# Patient Record
Sex: MALE | Race: WHITE | Employment: OTHER | URBAN - METROPOLITAN AREA
[De-identification: names, ages, dates, MRNs, and addresses within clinical notes are randomized per-mention and may not be internally consistent; named-entity substitution may affect disease eponyms.]

---

## 2019-07-19 ENCOUNTER — APPOINTMENT (OUTPATIENT)
Dept: GENERAL RADIOLOGY | Age: 34
End: 2019-07-19

## 2019-07-19 ENCOUNTER — HOSPITAL ENCOUNTER (EMERGENCY)
Age: 34
Discharge: HOME | End: 2019-07-19

## 2019-07-19 VITALS
HEIGHT: 70 IN | TEMPERATURE: 98.2 F | HEART RATE: 77 BPM | RESPIRATION RATE: 16 BRPM | BODY MASS INDEX: 22.9 KG/M2 | WEIGHT: 160 LBS | DIASTOLIC BLOOD PRESSURE: 57 MMHG | OXYGEN SATURATION: 98 % | SYSTOLIC BLOOD PRESSURE: 112 MMHG

## 2019-07-19 DIAGNOSIS — S61.431A PUNCTURE WOUND OF RIGHT HAND, FOREIGN BODY PRESENCE UNSPECIFIED, INITIAL ENCOUNTER: Primary | ICD-10-CM

## 2019-07-19 PROCEDURE — 90471 IMMUNIZATION ADMIN: CPT | Performed by: PHYSICIAN ASSISTANT

## 2019-07-19 PROCEDURE — 90715 TDAP VACCINE 7 YRS/> IM: CPT | Performed by: PHYSICIAN ASSISTANT

## 2019-07-19 PROCEDURE — 99283 EMERGENCY DEPT VISIT LOW MDM: CPT

## 2019-07-19 PROCEDURE — 73130 X-RAY EXAM OF HAND: CPT

## 2019-07-19 PROCEDURE — 6360000002 HC RX W HCPCS: Performed by: PHYSICIAN ASSISTANT

## 2019-07-19 PROCEDURE — 6370000000 HC RX 637 (ALT 250 FOR IP): Performed by: PHYSICIAN ASSISTANT

## 2019-07-19 RX ORDER — NAPROXEN 500 MG/1
500 TABLET ORAL 2 TIMES DAILY
Qty: 14 TABLET | Refills: 0 | Status: SHIPPED | OUTPATIENT
Start: 2019-07-19 | End: 2022-10-21

## 2019-07-19 RX ORDER — AMOXICILLIN AND CLAVULANATE POTASSIUM 875; 125 MG/1; MG/1
1 TABLET, FILM COATED ORAL 2 TIMES DAILY
Qty: 14 TABLET | Refills: 0 | Status: SHIPPED | OUTPATIENT
Start: 2019-07-19 | End: 2019-07-26

## 2019-07-19 RX ORDER — HYDROCODONE BITARTRATE AND ACETAMINOPHEN 5; 325 MG/1; MG/1
1 TABLET ORAL ONCE
Status: COMPLETED | OUTPATIENT
Start: 2019-07-19 | End: 2019-07-19

## 2019-07-19 RX ORDER — AMOXICILLIN AND CLAVULANATE POTASSIUM 875; 125 MG/1; MG/1
1 TABLET, FILM COATED ORAL ONCE
Status: COMPLETED | OUTPATIENT
Start: 2019-07-19 | End: 2019-07-19

## 2019-07-19 RX ADMIN — TETANUS TOXOID, REDUCED DIPHTHERIA TOXOID AND ACELLULAR PERTUSSIS VACCINE, ADSORBED 0.5 ML: 5; 2.5; 8; 8; 2.5 SUSPENSION INTRAMUSCULAR at 18:43

## 2019-07-19 RX ADMIN — HYDROCODONE BITARTRATE AND ACETAMINOPHEN 1 TABLET: 5; 325 TABLET ORAL at 18:42

## 2019-07-19 RX ADMIN — AMOXICILLIN AND CLAVULANATE POTASSIUM 1 TABLET: 875; 125 TABLET, FILM COATED ORAL at 18:42

## 2019-07-19 ASSESSMENT — PAIN DESCRIPTION - LOCATION: LOCATION: HAND

## 2019-07-19 ASSESSMENT — PAIN SCALES - GENERAL: PAINLEVEL_OUTOF10: 10

## 2020-10-23 ENCOUNTER — HOSPITAL ENCOUNTER (EMERGENCY)
Age: 35
Discharge: HOME | End: 2020-10-23

## 2020-10-23 VITALS
HEART RATE: 66 BPM | HEIGHT: 70 IN | DIASTOLIC BLOOD PRESSURE: 60 MMHG | RESPIRATION RATE: 14 BRPM | BODY MASS INDEX: 22.9 KG/M2 | WEIGHT: 160 LBS | TEMPERATURE: 98.2 F | OXYGEN SATURATION: 98 % | SYSTOLIC BLOOD PRESSURE: 132 MMHG

## 2020-10-23 PROCEDURE — 99284 EMERGENCY DEPT VISIT MOD MDM: CPT

## 2020-10-23 PROCEDURE — 6370000000 HC RX 637 (ALT 250 FOR IP): Performed by: PHYSICIAN ASSISTANT

## 2020-10-23 RX ORDER — IBUPROFEN 600 MG/1
600 TABLET ORAL ONCE
Status: COMPLETED | OUTPATIENT
Start: 2020-10-23 | End: 2020-10-23

## 2020-10-23 RX ORDER — TOBRAMYCIN 3 MG/ML
1 SOLUTION/ DROPS OPHTHALMIC EVERY 4 HOURS
Qty: 1 BOTTLE | Refills: 0 | Status: SHIPPED | OUTPATIENT
Start: 2020-10-23 | End: 2020-11-02

## 2020-10-23 RX ORDER — TETRACAINE HYDROCHLORIDE 5 MG/ML
2 SOLUTION OPHTHALMIC ONCE
Status: COMPLETED | OUTPATIENT
Start: 2020-10-23 | End: 2020-10-23

## 2020-10-23 RX ADMIN — TETRACAINE HYDROCHLORIDE 2 DROP: 5 SOLUTION OPHTHALMIC at 12:03

## 2020-10-23 RX ADMIN — FLUORESCEIN SODIUM 1 EACH: 0.6 STRIP OPHTHALMIC at 12:03

## 2020-10-23 RX ADMIN — IBUPROFEN 600 MG: 600 TABLET ORAL at 12:02

## 2020-10-23 ASSESSMENT — PAIN DESCRIPTION - LOCATION: LOCATION: EYE

## 2020-10-23 ASSESSMENT — VISUAL ACUITY
OS: 20/20
OD: 20/20
OU: 20/20

## 2020-10-23 ASSESSMENT — PAIN SCALES - GENERAL
PAINLEVEL_OUTOF10: 10
PAINLEVEL_OUTOF10: 10

## 2020-10-23 ASSESSMENT — PAIN DESCRIPTION - ORIENTATION: ORIENTATION: LEFT

## 2020-10-23 NOTE — ED PROVIDER NOTES
[facility]                                                                                                                                    Department of Emergency Medicine   ED  Provider Note  Admit Date/RoomTime: 10/23/2020 11:28 AM  ED Room: [place] [address]/[facility]-03        HPI:  10/23/20,   Time: 11:49 AM EDT         Heather Milan is a 38 y.o. male presenting to the ED for left eye injury/pain, beginning 1 day ago. The complaint has been persistent, moderate in severity, and worsened by opening eye/lights. The patient states he is a  and a piece of shingle flew up and hit him in left eye yesterday. Pt does not wear contacts. States eye is painful and tearing frequently. States it doesn't feel like anything is stuck in the eye. No purulent drainage. Pt does not wear contacts. States Td is UTD. No other injuries or complaints. ROS:     Constitutional: Negative for fever and chills  HENT: Negative for ear pain, sore throat and sinus pressure  Eyes: See HPi  Respiratory:  Negative for shortness of breath, cough and wheezing  Cardiovascular: Negative for CP, edema or palpitations  Gastrointestinal: Negative for nausea, vomiting, diarrhea and abdominal distention  Genitourinary: Negative for dysuria and frequency  Musculoskeletal: Negative for back pain and arthralgia  Skin: Negative for rash and wound  Neurological: Negative for weakness and headaches  Hematological: Negative for adenopathy    All other systems reviewed and are negative      -------------------------------- PAST HISTORY ----------------------------------  Past Medical History:  has a past medical history of GERD (gastroesophageal reflux disease) and Ruptured spleen. Past Surgical History:  has a past surgical history that includes Abdomen surgery. Social History:  reports that he has been smoking cigarettes. He has a 5.00 pack-year smoking history. He has never used smokeless tobacco. He reports current alcohol use.  He reports current drug use. Drug: Marijuana. Family History: family history is not on file. The patients home medications have been reviewed. Allergies: Patient has no known allergies. --------------------------------- RESULTS ------------------------------------------  All laboratory and radiology results have been personally reviewed by myself   LABS:  No results found for this visit on 10/23/20. RADIOLOGY:  Interpreted by Radiologist.  No orders to display       ----------------- NURSING NOTES AND VITALS REVIEWED ---------------   The nursing notes within the ED encounter and vital signs as below have been reviewed. /60   Pulse 66   Temp 98.2 °F (36.8 °C) (Oral)   Resp 14   Ht 5' 10\" (1.778 m)   Wt 160 lb (72.6 kg)   SpO2 98%   BMI 22.96 kg/m²   Oxygen Saturation Interpretation: Normal      --------------------------------PHYSICAL EXAM------------------------------------      Constitutional/General: Alert and oriented x3, well appearing, non toxic in NAD  Head: NC/AT  Eyes: PERRL, EOMI. Left eye is injected/red. No visible drainage. No penetrating injury/trauma. Eye examined using tetracaine drops and fluorescein. Questionable small abrasion seen at 7 oclock. No visible FB. Upper and lower eyelids everted. No visible FB noted  Mouth: Oropharynx clear, handling secretions, no trismus  Neck: Supple, full ROM, no meningeal signs  Pulmonary: Lungs clear to auscultation bilaterally, no wheezes, rales, or rhonchi. Not in respiratory distress  Cardiovascular:  Regular rate and rhythm, no murmurs, gallops, or rubs. 2+ distal pulses  Extremities: Moves all extremities x 4. Warm and well perfused  Skin: warm and dry without rash  Neurologic: GCS 15,  Intact.   No focal deficits  Psych: Normal Affect      ------------------------ ED COURSE/MEDICAL DECISION MAKING----------------------  Medications   tetracaine (TETRAVISC) 0.5 % ophthalmic solution 2 drop (2 drops Left Eye Given 10/23/20 1203)   fluorescein ophthalmic strip 1 each (1 each Left Eye Given 10/23/20 1203)   ibuprofen (ADVIL;MOTRIN) tablet 600 mg (600 mg Oral Given 10/23/20 1202)         Medical Decision Making:    Pt has small corneal abrasion. ? Early conjunctivitis. No visible FB seen. Pt reassured. F/U Opthamology as needed if persistent symptoms. .         Counseling: The emergency provider has spoken with the patient and discussed todays results, in addition to providing specific details for the plan of care and counseling regarding the diagnosis and prognosis. Questions are answered at this time and they are agreeable with the plan.      ------------------------ IMPRESSION AND DISPOSITION -------------------------------    IMPRESSION  1.  Abrasion of right cornea, initial encounter        DISPOSITION  Disposition: Discharge to home  Patient condition is stable                   Shanae Sommer PA-C  10/23/20 1222

## 2020-10-23 NOTE — ED NOTES
INSTRUCTION TO PT PER ESTEBAN WALTON PAC. LT EYE REDDENED BUT STATES PAIN IS LESS.       Jennifer Harper LPN  78/11/67 1028

## 2022-08-23 ENCOUNTER — APPOINTMENT (OUTPATIENT)
Dept: GENERAL RADIOLOGY | Age: 37
End: 2022-08-23

## 2022-08-23 ENCOUNTER — HOSPITAL ENCOUNTER (EMERGENCY)
Age: 37
Discharge: HOME | End: 2022-08-23
Attending: EMERGENCY MEDICINE

## 2022-08-23 ENCOUNTER — APPOINTMENT (OUTPATIENT)
Dept: CT IMAGING | Age: 37
End: 2022-08-23

## 2022-08-23 VITALS
WEIGHT: 160 LBS | BODY MASS INDEX: 22.9 KG/M2 | HEIGHT: 70 IN | DIASTOLIC BLOOD PRESSURE: 87 MMHG | HEART RATE: 62 BPM | TEMPERATURE: 98.1 F | SYSTOLIC BLOOD PRESSURE: 129 MMHG | RESPIRATION RATE: 17 BRPM | OXYGEN SATURATION: 97 %

## 2022-08-23 DIAGNOSIS — R10.13 ABDOMINAL PAIN, EPIGASTRIC: Primary | ICD-10-CM

## 2022-08-23 DIAGNOSIS — K29.00 ACUTE SUPERFICIAL GASTRITIS WITHOUT HEMORRHAGE: ICD-10-CM

## 2022-08-23 LAB
ALBUMIN SERPL-MCNC: 4.5 G/DL (ref 3.5–5.2)
ALP BLD-CCNC: 86 U/L (ref 40–129)
ALT SERPL-CCNC: 23 U/L (ref 0–40)
ANION GAP SERPL CALCULATED.3IONS-SCNC: 11 MMOL/L (ref 7–16)
AST SERPL-CCNC: 21 U/L (ref 0–39)
BASOPHILS ABSOLUTE: 0.05 E9/L (ref 0–0.2)
BASOPHILS RELATIVE PERCENT: 0.5 % (ref 0–2)
BILIRUB SERPL-MCNC: 0.4 MG/DL (ref 0–1.2)
BILIRUBIN DIRECT: <0.2 MG/DL (ref 0–0.3)
BILIRUBIN, INDIRECT: NORMAL MG/DL (ref 0–1)
BUN BLDV-MCNC: 15 MG/DL (ref 6–20)
CALCIUM SERPL-MCNC: 9.3 MG/DL (ref 8.6–10.2)
CHLORIDE BLD-SCNC: 104 MMOL/L (ref 98–107)
CO2: 24 MMOL/L (ref 22–29)
CREAT SERPL-MCNC: 0.8 MG/DL (ref 0.7–1.2)
EOSINOPHILS ABSOLUTE: 0.2 E9/L (ref 0.05–0.5)
EOSINOPHILS RELATIVE PERCENT: 1.9 % (ref 0–6)
GFR AFRICAN AMERICAN: >60
GFR NON-AFRICAN AMERICAN: >60 ML/MIN/1.73
GLUCOSE BLD-MCNC: 123 MG/DL (ref 74–99)
HCT VFR BLD CALC: 40.9 % (ref 37–54)
HEMOGLOBIN: 14.3 G/DL (ref 12.5–16.5)
IMMATURE GRANULOCYTES #: 0.05 E9/L
IMMATURE GRANULOCYTES %: 0.5 % (ref 0–5)
LIPASE: 44 U/L (ref 13–60)
LYMPHOCYTES ABSOLUTE: 1.43 E9/L (ref 1.5–4)
LYMPHOCYTES RELATIVE PERCENT: 13.5 % (ref 20–42)
MCH RBC QN AUTO: 33.2 PG (ref 26–35)
MCHC RBC AUTO-ENTMCNC: 35 % (ref 32–34.5)
MCV RBC AUTO: 94.9 FL (ref 80–99.9)
MONOCYTES ABSOLUTE: 0.59 E9/L (ref 0.1–0.95)
MONOCYTES RELATIVE PERCENT: 5.6 % (ref 2–12)
NEUTROPHILS ABSOLUTE: 8.3 E9/L (ref 1.8–7.3)
NEUTROPHILS RELATIVE PERCENT: 78 % (ref 43–80)
PDW BLD-RTO: 11.2 FL (ref 11.5–15)
PLATELET # BLD: 255 E9/L (ref 130–450)
PMV BLD AUTO: 9.4 FL (ref 7–12)
POTASSIUM REFLEX MAGNESIUM: 4.1 MMOL/L (ref 3.5–5)
RBC # BLD: 4.31 E12/L (ref 3.8–5.8)
SODIUM BLD-SCNC: 139 MMOL/L (ref 132–146)
TOTAL PROTEIN: 6.7 G/DL (ref 6.4–8.3)
TROPONIN, HIGH SENSITIVITY: 7 NG/L (ref 0–11)
WBC # BLD: 10.6 E9/L (ref 4.5–11.5)

## 2022-08-23 PROCEDURE — 93005 ELECTROCARDIOGRAM TRACING: CPT | Performed by: EMERGENCY MEDICINE

## 2022-08-23 PROCEDURE — 6360000002 HC RX W HCPCS: Performed by: EMERGENCY MEDICINE

## 2022-08-23 PROCEDURE — 2500000003 HC RX 250 WO HCPCS: Performed by: EMERGENCY MEDICINE

## 2022-08-23 PROCEDURE — 96374 THER/PROPH/DIAG INJ IV PUSH: CPT

## 2022-08-23 PROCEDURE — 71046 X-RAY EXAM CHEST 2 VIEWS: CPT

## 2022-08-23 PROCEDURE — 80048 BASIC METABOLIC PNL TOTAL CA: CPT

## 2022-08-23 PROCEDURE — 84484 ASSAY OF TROPONIN QUANT: CPT

## 2022-08-23 PROCEDURE — 96375 TX/PRO/DX INJ NEW DRUG ADDON: CPT

## 2022-08-23 PROCEDURE — 74176 CT ABD & PELVIS W/O CONTRAST: CPT

## 2022-08-23 PROCEDURE — 99285 EMERGENCY DEPT VISIT HI MDM: CPT

## 2022-08-23 PROCEDURE — 85025 COMPLETE CBC W/AUTO DIFF WBC: CPT

## 2022-08-23 PROCEDURE — 80076 HEPATIC FUNCTION PANEL: CPT

## 2022-08-23 PROCEDURE — A4216 STERILE WATER/SALINE, 10 ML: HCPCS | Performed by: EMERGENCY MEDICINE

## 2022-08-23 PROCEDURE — 2580000003 HC RX 258: Performed by: EMERGENCY MEDICINE

## 2022-08-23 PROCEDURE — 83690 ASSAY OF LIPASE: CPT

## 2022-08-23 RX ORDER — KETOROLAC TROMETHAMINE 30 MG/ML
30 INJECTION, SOLUTION INTRAMUSCULAR; INTRAVENOUS ONCE
Status: COMPLETED | OUTPATIENT
Start: 2022-08-23 | End: 2022-08-23

## 2022-08-23 RX ORDER — ONDANSETRON 2 MG/ML
4 INJECTION INTRAMUSCULAR; INTRAVENOUS ONCE
Status: COMPLETED | OUTPATIENT
Start: 2022-08-23 | End: 2022-08-23

## 2022-08-23 RX ORDER — SODIUM CHLORIDE 0.9 % (FLUSH) 0.9 %
SYRINGE (ML) INJECTION
Status: DISCONTINUED
Start: 2022-08-23 | End: 2022-08-23 | Stop reason: HOSPADM

## 2022-08-23 RX ORDER — PROMETHAZINE HYDROCHLORIDE 25 MG/1
25 TABLET ORAL EVERY 6 HOURS PRN
Qty: 8 TABLET | Refills: 0 | Status: SHIPPED | OUTPATIENT
Start: 2022-08-23 | End: 2022-10-21

## 2022-08-23 RX ADMIN — FAMOTIDINE 20 MG: 10 INJECTION, SOLUTION INTRAVENOUS at 08:10

## 2022-08-23 RX ADMIN — ONDANSETRON 4 MG: 2 INJECTION INTRAMUSCULAR; INTRAVENOUS at 08:08

## 2022-08-23 RX ADMIN — KETOROLAC TROMETHAMINE 30 MG: 30 INJECTION, SOLUTION INTRAMUSCULAR at 08:04

## 2022-08-23 ASSESSMENT — ENCOUNTER SYMPTOMS
VOMITING: 0
BLOOD IN STOOL: 0
SORE THROAT: 0
SINUS PRESSURE: 0
ABDOMINAL DISTENTION: 0
BACK PAIN: 0
WHEEZING: 0
NAUSEA: 1
DIARRHEA: 1
SHORTNESS OF BREATH: 0
CHEST TIGHTNESS: 0
COUGH: 1
ABDOMINAL PAIN: 1

## 2022-08-23 ASSESSMENT — PAIN DESCRIPTION - LOCATION: LOCATION: ABDOMEN;CHEST

## 2022-08-23 ASSESSMENT — PAIN SCALES - GENERAL
PAINLEVEL_OUTOF10: 8
PAINLEVEL_OUTOF10: 8

## 2022-08-23 ASSESSMENT — LIFESTYLE VARIABLES: HOW OFTEN DO YOU HAVE A DRINK CONTAINING ALCOHOL: 2-3 TIMES A WEEK

## 2022-08-23 ASSESSMENT — PAIN - FUNCTIONAL ASSESSMENT: PAIN_FUNCTIONAL_ASSESSMENT: 0-10

## 2022-08-23 NOTE — ED PROVIDER NOTES
Chief complaint:  Chest pain, abdominal pain    HPI history provided by the patient  Patient presents her complaining of a lower midsternal chest upper abdominal pain overnight with nausea and diarrhea. No abdominal distention. Also has associated general abdominal pain. No back or flank pain. No migration or radiation of pain. No other chest pain. No shortness of breath or palpitations. No lightheadedness or syncope. No vomiting. No fevers, sweats or chills. Does have a mild cough as well he states. No general body aches otherwise. No treatment prior to arrival.  Nothing makes it better or worse. No lightheadedness or syncope. No recent traveling or surgeries, no history of blood clots, no leg pain or swelling. No pleuritic style chest pain, no pain with breathing. Review of Systems   Constitutional:  Negative for chills, diaphoresis, fatigue and fever. HENT:  Negative for congestion, sinus pressure and sore throat. Respiratory:  Positive for cough. Negative for chest tightness, shortness of breath and wheezing. Cardiovascular:  Positive for chest pain. Negative for palpitations and leg swelling. Gastrointestinal:  Positive for abdominal pain, diarrhea and nausea. Negative for abdominal distention, blood in stool and vomiting. Genitourinary:  Negative for dysuria, flank pain, frequency, hematuria and urgency. Musculoskeletal:  Negative for arthralgias, back pain, gait problem, joint swelling, myalgias, neck pain and neck stiffness. Skin:  Negative for rash and wound. Neurological:  Negative for dizziness, seizures, syncope, weakness, light-headedness and headaches. All other systems reviewed and are negative. Physical Exam  Vitals and nursing note reviewed. Constitutional:       General: He is awake. He is not in acute distress. Appearance: He is well-developed. He is not ill-appearing, toxic-appearing or diaphoretic. HENT:      Head: Normocephalic and atraumatic. Mouth/Throat:      Pharynx: Oropharynx is clear. Uvula midline. No pharyngeal swelling, oropharyngeal exudate, posterior oropharyngeal erythema or uvula swelling. Tonsils: No tonsillar exudate or tonsillar abscesses. Eyes:      General: No scleral icterus. Pupils: Pupils are equal, round, and reactive to light. Cardiovascular:      Rate and Rhythm: Normal rate and regular rhythm. Heart sounds: Normal heart sounds. No murmur heard. Pulmonary:      Effort: Pulmonary effort is normal. No respiratory distress. Breath sounds: Normal breath sounds. No stridor, decreased air movement or transmitted upper airway sounds. No decreased breath sounds, wheezing, rhonchi or rales. Chest:      Chest wall: No tenderness. Abdominal:      General: Bowel sounds are normal.      Palpations: Abdomen is soft. Tenderness: There is generalized abdominal tenderness. There is no right CVA tenderness, left CVA tenderness, guarding or rebound. Comments: Mild general and diffuse abdominal tenderness, minimally worse in the epigastric area. No pulsatile mass. No distention. No guarding, rebound tenderness or rigidity. No CVA tenderness. No jaundice or icterus. Musculoskeletal:         General: No swelling, tenderness, deformity or signs of injury. Cervical back: Full passive range of motion without pain, normal range of motion and neck supple. No spinous process tenderness or muscular tenderness. Right lower leg: No edema. Left lower leg: No edema. Comments: Arms and legs are neurovascular intact. No pretibial edema or calf pain. Skin:     General: Skin is warm and dry. Coloration: Skin is not cyanotic, jaundiced, mottled or pale. Findings: No bruising, erythema or rash. Neurological:      General: No focal deficit present. Mental Status: He is alert and oriented to person, place, and time. GCS: GCS eye subscore is 4. GCS verbal subscore is 5.  GCS motor subscore is 6. Cranial Nerves: No cranial nerve deficit. Coordination: Coordination normal.   Psychiatric:         Behavior: Behavior is cooperative. Procedures     [facility]       ED Course as of 08/23/22 0915   Tue Aug 23, 2022   0914 Patient laying in the bed resting comfortably no acute distress. States he is feeling much better. Work-up results are discussed, he is very comfortable in home with outpatient follow-up at this time. [NC]      ED Course User Index  [NC] Alex Worley DO        EKG Interpretation    Interpreted by emergency department physician    Rhythm: normal sinus   Rate: 72  Axis: normal  Ectopy: none  Conduction: normal  ST Segments: normal  T Waves: normal  Q Waves: none    Clinical Impression: no acute changes    Alex Worley DO      ED Course as of 08/23/22 0916   Tue Aug 23, 2022   0914 Patient laying in the bed resting comfortably no acute distress. States he is feeling much better. Work-up results are discussed, he is very comfortable in home with outpatient follow-up at this time. [NC]      ED Course User Index  [NC] Meli Obregon DO       --------------------------------------------- PAST HISTORY ---------------------------------------------  Past Medical History:  has a past medical history of GERD (gastroesophageal reflux disease) and Ruptured spleen. Past Surgical History:  has a past surgical history that includes Abdomen surgery. Social History:  reports that he has been smoking cigarettes. He has a 20.00 pack-year smoking history. He has never used smokeless tobacco. He reports current alcohol use. He reports current drug use. Drug: Marijuana Berneta Nicholas). Family History: family history is not on file. The patients home medications have been reviewed. Allergies: Patient has no known allergies.     -------------------------------------------------- RESULTS -------------------------------------------------  Labs:  Results for orders placed or performed during the hospital encounter of 08/23/22   CBC with Auto Differential   Result Value Ref Range    WBC 10.6 4.5 - 11.5 E9/L    RBC 4.31 3.80 - 5.80 E12/L    Hemoglobin 14.3 12.5 - 16.5 g/dL    Hematocrit 40.9 37.0 - 54.0 %    MCV 94.9 80.0 - 99.9 fL    MCH 33.2 26.0 - 35.0 pg    MCHC 35.0 (H) 32.0 - 34.5 %    RDW 11.2 (L) 11.5 - 15.0 fL    Platelets 939 324 - 510 E9/L    MPV 9.4 7.0 - 12.0 fL    Neutrophils % 78.0 43.0 - 80.0 %    Immature Granulocytes % 0.5 0.0 - 5.0 %    Lymphocytes % 13.5 (L) 20.0 - 42.0 %    Monocytes % 5.6 2.0 - 12.0 %    Eosinophils % 1.9 0.0 - 6.0 %    Basophils % 0.5 0.0 - 2.0 %    Neutrophils Absolute 8.30 (H) 1.80 - 7.30 E9/L    Immature Granulocytes # 0.05 E9/L    Lymphocytes Absolute 1.43 (L) 1.50 - 4.00 E9/L    Monocytes Absolute 0.59 0.10 - 0.95 E9/L    Eosinophils Absolute 0.20 0.05 - 0.50 E9/L    Basophils Absolute 0.05 0.00 - 0.20 B3/I   Basic Metabolic Panel w/ Reflex to MG   Result Value Ref Range    Sodium 139 132 - 146 mmol/L    Potassium reflex Magnesium 4.1 3.5 - 5.0 mmol/L    Chloride 104 98 - 107 mmol/L    CO2 24 22 - 29 mmol/L    Anion Gap 11 7 - 16 mmol/L    Glucose 123 (H) 74 - 99 mg/dL    BUN 15 6 - 20 mg/dL    Creatinine 0.8 0.7 - 1.2 mg/dL    GFR Non-African American >60 >=60 mL/min/1.73    GFR African American >60     Calcium 9.3 8.6 - 10.2 mg/dL   Hepatic Function Panel   Result Value Ref Range    Total Protein 6.7 6.4 - 8.3 g/dL    Albumin 4.5 3.5 - 5.2 g/dL    Alkaline Phosphatase 86 40 - 129 U/L    ALT 23 0 - 40 U/L    AST 21 0 - 39 U/L    Total Bilirubin 0.4 0.0 - 1.2 mg/dL    Bilirubin, Direct <0.2 0.0 - 0.3 mg/dL    Bilirubin, Indirect see below 0.0 - 1.0 mg/dL   Lipase   Result Value Ref Range    Lipase 44 13 - 60 U/L   Troponin   Result Value Ref Range    Troponin, High Sensitivity 7 0 - 11 ng/L   EKG 12 Lead   Result Value Ref Range    Ventricular Rate 72 BPM    Atrial Rate 72 BPM    P-R Interval 150 ms    QRS Duration 74 ms    Q-T Interval 380 ms    QTc Calculation (Bazett) 416 ms    P Axis 54 degrees    R Axis 61 degrees    T Axis 47 degrees       Radiology:  CT ABDOMEN PELVIS WO CONTRAST Additional Contrast? None   Final Result   No acute findings of the abdomen or pelvis specifically no mechanical   obstructive process or obstructing uropathy. Gallbladder unremarkable   without biliary dilatation. XR CHEST (2 VW)   Final Result   No acute cardiopulmonary process. ------------------------- NURSING NOTES AND VITALS REVIEWED ---------------------------  Date / Time Roomed:  8/23/2022  7:39 AM  ED Bed Assignment:  12/12    The nursing notes within the ED encounter and vital signs as below have been reviewed. /87   Pulse 80   Temp 98.1 °F (36.7 °C) (Oral)   Resp 16   Ht 5' 10\" (1.778 m)   Wt 160 lb (72.6 kg)   SpO2 98%   BMI 22.96 kg/m²   Oxygen Saturation Interpretation: Normal      ------------------------------------------ PROGRESS NOTES ------------------------------------------  I have spoken with the patient and discussed todays results, in addition to providing specific details for the plan of care and counseling regarding the diagnosis and prognosis. Their questions are answered at this time and they are agreeable with the plan. I discussed at length with them reasons for immediate return here for re evaluation. They will followup with primary care by calling their office tomorrow. --------------------------------- ADDITIONAL PROVIDER NOTES ---------------------------------  At this time the patient is without objective evidence of an acute process requiring hospitalization or inpatient management. They have remained hemodynamically stable throughout their entire ED visit and are stable for discharge with outpatient follow-up. The plan has been discussed in detail and they are aware of the specific conditions for emergent return, as well as the importance of follow-up.       New Prescriptions    PROMETHAZINE (PHENERGAN) 25 MG TABLET    Take 1 tablet by mouth every 6 hours as needed for Nausea       Diagnosis:  1. Abdominal pain, epigastric    2. Acute superficial gastritis without hemorrhage        Disposition:  Patient's disposition: Discharge to home  Patient's condition is stable.          Arsenio Kenny DO  08/23/22 9247

## 2022-08-24 LAB
EKG ATRIAL RATE: 72 BPM
EKG P AXIS: 54 DEGREES
EKG P-R INTERVAL: 150 MS
EKG Q-T INTERVAL: 380 MS
EKG QRS DURATION: 74 MS
EKG QTC CALCULATION (BAZETT): 416 MS
EKG R AXIS: 61 DEGREES
EKG T AXIS: 47 DEGREES
EKG VENTRICULAR RATE: 72 BPM

## 2022-10-21 ENCOUNTER — HOSPITAL ENCOUNTER (EMERGENCY)
Age: 37
Discharge: HOME | End: 2022-10-21
Attending: EMERGENCY MEDICINE

## 2022-10-21 ENCOUNTER — APPOINTMENT (OUTPATIENT)
Dept: GENERAL RADIOLOGY | Age: 37
End: 2022-10-21

## 2022-10-21 VITALS
HEART RATE: 78 BPM | WEIGHT: 150 LBS | BODY MASS INDEX: 21.47 KG/M2 | HEIGHT: 70 IN | RESPIRATION RATE: 16 BRPM | TEMPERATURE: 97.9 F | SYSTOLIC BLOOD PRESSURE: 134 MMHG | OXYGEN SATURATION: 97 % | DIASTOLIC BLOOD PRESSURE: 79 MMHG

## 2022-10-21 DIAGNOSIS — J06.9 UPPER RESPIRATORY TRACT INFECTION, UNSPECIFIED TYPE: Primary | ICD-10-CM

## 2022-10-21 PROCEDURE — 6360000002 HC RX W HCPCS: Performed by: PHYSICIAN ASSISTANT

## 2022-10-21 PROCEDURE — 71046 X-RAY EXAM CHEST 2 VIEWS: CPT

## 2022-10-21 PROCEDURE — 96372 THER/PROPH/DIAG INJ SC/IM: CPT

## 2022-10-21 PROCEDURE — 99284 EMERGENCY DEPT VISIT MOD MDM: CPT

## 2022-10-21 RX ORDER — METHYLPREDNISOLONE 4 MG/1
TABLET ORAL
Qty: 21 TABLET | Refills: 0 | Status: SHIPPED | OUTPATIENT
Start: 2022-10-21 | End: 2022-10-27

## 2022-10-21 RX ORDER — BENZONATATE 100 MG/1
100 CAPSULE ORAL 3 TIMES DAILY PRN
Qty: 30 CAPSULE | Refills: 0 | Status: SHIPPED | OUTPATIENT
Start: 2022-10-21 | End: 2022-10-28

## 2022-10-21 RX ORDER — KETOROLAC TROMETHAMINE 30 MG/ML
30 INJECTION, SOLUTION INTRAMUSCULAR; INTRAVENOUS ONCE
Status: COMPLETED | OUTPATIENT
Start: 2022-10-21 | End: 2022-10-21

## 2022-10-21 RX ADMIN — KETOROLAC TROMETHAMINE 30 MG: 30 INJECTION, SOLUTION INTRAMUSCULAR at 14:37

## 2022-10-21 ASSESSMENT — PAIN DESCRIPTION - ORIENTATION: ORIENTATION: LEFT

## 2022-10-21 ASSESSMENT — PAIN SCALES - GENERAL: PAINLEVEL_OUTOF10: 10

## 2022-10-21 ASSESSMENT — PAIN DESCRIPTION - LOCATION: LOCATION: BACK

## 2022-10-21 ASSESSMENT — PAIN DESCRIPTION - DESCRIPTORS: DESCRIPTORS: ACHING

## 2022-10-21 NOTE — ED PROVIDER NOTES
[facility]         Department of Emergency Medicine   ED  Provider Note  Admit Date/RoomTime: 10/21/2022  1:47 PM  ED Room: 14/14        10/21/22  2:08 PM EDT      HPI: Carlos A Lyon is a 42 y.o. male with a past medical history of  has a past medical history of GERD (gastroesophageal reflux disease) and Ruptured spleen. presenting with complaints of a cough with nasal congestion. The patient states that these symptoms began gradually over the past several days. The history is obtained from the patient. Patient does complain of a mild cough associated with it that is nonproductive. He will also have some back pain associated with his cough. No chest pain or shortness of breath. No prolonged immobilization, recent travel, or hospitalization. Patient denies excessive fatigue or sleeping greater than 18 hours a day. Patient denies exposure to mononucleosis. The patient denies any abdominal pain, left upper quadrant fullness, or early satiety. The patient also denies difficulty breathing, hemoptysis, neck pain/stiffness, or blurry vision, or chest pain. Sx have persisted and are mildly worse which is what prompted the visit today. Does report exposure to sick contacts as his 3year old son was recently hospitalized at [facility] for rhinovirus. Review of Systems:   A complete review of systems was performed and pertinent positives and negatives are stated within HPI, all other systems reviewed and are negative.        --------------------------------------------- PAST HISTORY ---------------------------------------------  Past Medical History:  has a past medical history of GERD (gastroesophageal reflux disease) and Ruptured spleen. Past Surgical History:  has a past surgical history that includes Abdomen surgery. Social History:  reports that he has been smoking cigarettes. He has a 20.00 pack-year smoking history. He has never used smokeless tobacco. He reports current alcohol use. He reports current drug use. Drug: Marijuana Seabron Barban). Family History: family history is not on file. The patients home medications have been reviewed. Allergies: Patient has no known allergies. Physical exam:  Constitutional: Vital signs are reviewed the patient is comfortable. The patient is alert and oriented and conversant. Head: The head is atraumatic and normocephalic. Eyes: No discharge is present from the eyes. The sclera are normal.  Ears: TMs bilaterally demonstrate no erythema, no bulging and no evidence of infection. Mouth: The oropharynx demonstrates a small amount of erythema bilaterally. There is no tonsillar enlargement nor is there any exudate present. No uvular deviation or edema. No tonsillary asymmetry. Floor of the mouth soft, no trismus, handling secretions. Neck: Normal range of motion is achieved in the neck. There is no JVD present. No meningeal signs are present   Anterior cervical adenopathy is absent. Respiratory: The chest is nontender. Breath sounds are clear to auscultation bilaterally. No wheezes, rales, or rhonchi. There is no respiratory distress. Cardiovascular: Heart shows a regular rate and rhythm without murmurs, rubs, clicks or gallops. Abdominal exam: The abdomen is non tender without evidence of peritoneal signs. Specific attention to the left upper quadrant with palpation of the spleen demonstrates no organomegaly or tenderness  Skin: warm and dry, without rash  Neurologic: GCS 15   Psych: Normal Affect  -------------------------------------------------- RESULTS -------------------------------------------------    LABS:  No results found for this visit on 10/21/22. RADIOLOGY:  Interpreted by Radiologist.  XR CHEST (2 VW)   Final Result   No acute process.                    ------------------------- NURSING NOTES AND VITALS REVIEWED ---------------------------   The nursing notes within the ED encounter and vital signs as below have been reviewed. /79   Pulse 78   Temp 97.9 °F (36.6 °C)   Resp 16   Ht 5' 10\" (1.778 m)   Wt 150 lb (68 kg)   SpO2 97%   BMI 21.52 kg/m²   Oxygen Saturation Interpretation: Normal    The patients available past medical records and past encounters were reviewed. ------------------------------ ED COURSE/MEDICAL DECISION MAKING----------------------  Medications   ketorolac (TORADOL) injection 30 mg (30 mg IntraMUSCular Given 10/21/22 8663)             Medical Decision Making:         Upper respiratory infection likely viral in etiology particularly as his son was recently diagnosed with rhinovirus. Not hypoxic, nothing to suggest pneumonia. Well appearing, non toxic, appropriate for outpatient management. Plan is for symptom management and PCP follow up. This patient's ED course included: a personal history and physicial eaxmination and re-evaluation prior to disposition    This patient has remained hemodynamically stable during their ED course. Counseling: The emergency provider has spoken with the patient and discussed todays results, in addition to providing specific details for the plan of care and counseling regarding the diagnosis and prognosis. Questions are answered at this time and they are agreeable with the plan.       --------------------------------- IMPRESSION AND DISPOSITION ---------------------------------    IMPRESSION  1.  Upper respiratory tract infection, unspecified type        DISPOSITION  Disposition: Discharge to home  Patient condition is good            [place], [place]  10/21/22 0272

## 2023-10-03 ENCOUNTER — APPOINTMENT (OUTPATIENT)
Dept: GENERAL RADIOLOGY | Age: 38
End: 2023-10-03

## 2023-10-03 ENCOUNTER — HOSPITAL ENCOUNTER (EMERGENCY)
Age: 38
Discharge: HOME | End: 2023-10-03
Attending: EMERGENCY MEDICINE

## 2023-10-03 ENCOUNTER — APPOINTMENT (OUTPATIENT)
Dept: CT IMAGING | Age: 38
End: 2023-10-03

## 2023-10-03 VITALS
WEIGHT: 150 LBS | HEIGHT: 70 IN | HEART RATE: 74 BPM | OXYGEN SATURATION: 100 % | BODY MASS INDEX: 21.47 KG/M2 | DIASTOLIC BLOOD PRESSURE: 72 MMHG | RESPIRATION RATE: 17 BRPM | TEMPERATURE: 98.3 F | SYSTOLIC BLOOD PRESSURE: 132 MMHG

## 2023-10-03 DIAGNOSIS — R10.9 ABDOMINAL PAIN, UNSPECIFIED ABDOMINAL LOCATION: Primary | ICD-10-CM

## 2023-10-03 LAB
ALBUMIN SERPL-MCNC: 4.7 G/DL (ref 3.5–5.2)
ALP SERPL-CCNC: 97 U/L (ref 40–129)
ALT SERPL-CCNC: 24 U/L (ref 0–40)
AMPHET UR QL SCN: NEGATIVE
ANION GAP SERPL CALCULATED.3IONS-SCNC: 12 MMOL/L (ref 7–16)
APAP SERPL-MCNC: <5 UG/ML (ref 10–30)
AST SERPL-CCNC: 19 U/L (ref 0–39)
BARBITURATES UR QL SCN: NEGATIVE
BASOPHILS # BLD: 0.03 K/UL (ref 0–0.2)
BASOPHILS NFR BLD: 0 % (ref 0–2)
BENZODIAZ UR QL: NEGATIVE
BILIRUB SERPL-MCNC: 0.5 MG/DL (ref 0–1.2)
BUN SERPL-MCNC: 11 MG/DL (ref 6–20)
BUPRENORPHINE UR QL: NEGATIVE
CALCIUM SERPL-MCNC: 9.5 MG/DL (ref 8.6–10.2)
CANNABINOIDS UR QL SCN: POSITIVE
CHLORIDE SERPL-SCNC: 98 MMOL/L (ref 98–107)
CO2 SERPL-SCNC: 29 MMOL/L (ref 22–29)
COCAINE UR QL SCN: NEGATIVE
CREAT SERPL-MCNC: 0.9 MG/DL (ref 0.7–1.2)
EKG ATRIAL RATE: 49 BPM
EKG P AXIS: 57 DEGREES
EKG P-R INTERVAL: 160 MS
EKG Q-T INTERVAL: 444 MS
EKG QRS DURATION: 82 MS
EKG QTC CALCULATION (BAZETT): 401 MS
EKG R AXIS: 67 DEGREES
EKG T AXIS: 57 DEGREES
EKG VENTRICULAR RATE: 49 BPM
EOSINOPHIL # BLD: 0.04 K/UL (ref 0.05–0.5)
EOSINOPHILS RELATIVE PERCENT: 1 % (ref 0–6)
ERYTHROCYTE [DISTWIDTH] IN BLOOD BY AUTOMATED COUNT: 10.8 % (ref 11.5–15)
ETHANOLAMINE SERPL-MCNC: <10 MG/DL
FENTANYL UR QL: NEGATIVE
FLUAV RNA RESP QL NAA+PROBE: NOT DETECTED
FLUBV RNA RESP QL NAA+PROBE: NOT DETECTED
GFR SERPL CREATININE-BSD FRML MDRD: >60 ML/MIN/1.73M2
GLUCOSE SERPL-MCNC: 123 MG/DL (ref 74–99)
HCT VFR BLD AUTO: 43.7 % (ref 37–54)
HGB BLD-MCNC: 15.5 G/DL (ref 12.5–16.5)
IMM GRANULOCYTES # BLD AUTO: 0.04 K/UL (ref 0–0.58)
IMM GRANULOCYTES NFR BLD: 1 % (ref 0–5)
LACTATE BLDV-SCNC: 0.8 MMOL/L (ref 0.5–2.2)
LIPASE SERPL-CCNC: 227 U/L (ref 13–60)
LYMPHOCYTES NFR BLD: 1.33 K/UL (ref 1.5–4)
LYMPHOCYTES RELATIVE PERCENT: 18 % (ref 20–42)
MCH RBC QN AUTO: 32.4 PG (ref 26–35)
MCHC RBC AUTO-ENTMCNC: 35.5 G/DL (ref 32–34.5)
MCV RBC AUTO: 91.4 FL (ref 80–99.9)
METHADONE UR QL: NEGATIVE
MONOCYTES NFR BLD: 0.58 K/UL (ref 0.1–0.95)
MONOCYTES NFR BLD: 8 % (ref 2–12)
NEUTROPHILS NFR BLD: 72 % (ref 43–80)
NEUTS SEG NFR BLD: 5.24 K/UL (ref 1.8–7.3)
OPIATES UR QL SCN: NEGATIVE
OXYCODONE UR QL SCN: NEGATIVE
PCP UR QL SCN: NEGATIVE
PLATELET # BLD AUTO: 261 K/UL (ref 130–450)
PMV BLD AUTO: 9 FL (ref 7–12)
POTASSIUM SERPL-SCNC: 4.2 MMOL/L (ref 3.5–5)
PROT SERPL-MCNC: 7.3 G/DL (ref 6.4–8.3)
RBC # BLD AUTO: 4.78 M/UL (ref 3.8–5.8)
SALICYLATES SERPL-MCNC: <0.3 MG/DL (ref 0–30)
SARS-COV-2 RNA RESP QL NAA+PROBE: NOT DETECTED
SODIUM SERPL-SCNC: 139 MMOL/L (ref 132–146)
SOURCE: NORMAL
SPECIMEN DESCRIPTION: NORMAL
SPECIMEN SOURCE: NORMAL
STREP A, MOLECULAR: NEGATIVE
TEST INFORMATION: ABNORMAL
TOXIC TRICYCLIC SC,BLOOD: NEGATIVE
TROPONIN I SERPL HS-MCNC: <6 NG/L (ref 0–11)
WBC OTHER # BLD: 7.3 K/UL (ref 4.5–11.5)

## 2023-10-03 PROCEDURE — 80179 DRUG ASSAY SALICYLATE: CPT

## 2023-10-03 PROCEDURE — 2580000003 HC RX 258: Performed by: EMERGENCY MEDICINE

## 2023-10-03 PROCEDURE — 6360000002 HC RX W HCPCS: Performed by: EMERGENCY MEDICINE

## 2023-10-03 PROCEDURE — 6360000004 HC RX CONTRAST MEDICATION: Performed by: RADIOLOGY

## 2023-10-03 PROCEDURE — 93010 ELECTROCARDIOGRAM REPORT: CPT | Performed by: INTERNAL MEDICINE

## 2023-10-03 PROCEDURE — 80307 DRUG TEST PRSMV CHEM ANLYZR: CPT

## 2023-10-03 PROCEDURE — 84484 ASSAY OF TROPONIN QUANT: CPT

## 2023-10-03 PROCEDURE — 87636 SARSCOV2 & INF A&B AMP PRB: CPT

## 2023-10-03 PROCEDURE — 83690 ASSAY OF LIPASE: CPT

## 2023-10-03 PROCEDURE — G0480 DRUG TEST DEF 1-7 CLASSES: HCPCS

## 2023-10-03 PROCEDURE — 99285 EMERGENCY DEPT VISIT HI MDM: CPT

## 2023-10-03 PROCEDURE — 96375 TX/PRO/DX INJ NEW DRUG ADDON: CPT

## 2023-10-03 PROCEDURE — 83605 ASSAY OF LACTIC ACID: CPT

## 2023-10-03 PROCEDURE — 93005 ELECTROCARDIOGRAM TRACING: CPT | Performed by: EMERGENCY MEDICINE

## 2023-10-03 PROCEDURE — 96374 THER/PROPH/DIAG INJ IV PUSH: CPT

## 2023-10-03 PROCEDURE — 71045 X-RAY EXAM CHEST 1 VIEW: CPT

## 2023-10-03 PROCEDURE — 85025 COMPLETE CBC W/AUTO DIFF WBC: CPT

## 2023-10-03 PROCEDURE — 80053 COMPREHEN METABOLIC PANEL: CPT

## 2023-10-03 PROCEDURE — 74177 CT ABD & PELVIS W/CONTRAST: CPT

## 2023-10-03 PROCEDURE — 80143 DRUG ASSAY ACETAMINOPHEN: CPT

## 2023-10-03 RX ORDER — ONDANSETRON 4 MG/1
4 TABLET, ORALLY DISINTEGRATING ORAL 3 TIMES DAILY PRN
Qty: 21 TABLET | Refills: 0 | Status: SHIPPED | OUTPATIENT
Start: 2023-10-03

## 2023-10-03 RX ORDER — 0.9 % SODIUM CHLORIDE 0.9 %
1000 INTRAVENOUS SOLUTION INTRAVENOUS ONCE
Status: COMPLETED | OUTPATIENT
Start: 2023-10-03 | End: 2023-10-03

## 2023-10-03 RX ORDER — ONDANSETRON 2 MG/ML
4 INJECTION INTRAMUSCULAR; INTRAVENOUS ONCE
Status: COMPLETED | OUTPATIENT
Start: 2023-10-03 | End: 2023-10-03

## 2023-10-03 RX ORDER — KETOROLAC TROMETHAMINE 30 MG/ML
30 INJECTION, SOLUTION INTRAMUSCULAR; INTRAVENOUS ONCE
Status: COMPLETED | OUTPATIENT
Start: 2023-10-03 | End: 2023-10-03

## 2023-10-03 RX ADMIN — IOPAMIDOL 75 ML: 755 INJECTION, SOLUTION INTRAVENOUS at 16:18

## 2023-10-03 RX ADMIN — ONDANSETRON 4 MG: 2 INJECTION INTRAMUSCULAR; INTRAVENOUS at 14:22

## 2023-10-03 RX ADMIN — SODIUM CHLORIDE 1000 ML: 9 INJECTION, SOLUTION INTRAVENOUS at 14:21

## 2023-10-03 RX ADMIN — KETOROLAC TROMETHAMINE 30 MG: 30 INJECTION, SOLUTION INTRAMUSCULAR; INTRAVENOUS at 14:21

## 2023-10-03 ASSESSMENT — PAIN DESCRIPTION - LOCATION
LOCATION: ABDOMEN
LOCATION: ABDOMEN;THROAT
LOCATION: ABDOMEN

## 2023-10-03 ASSESSMENT — PAIN DESCRIPTION - FREQUENCY: FREQUENCY: CONTINUOUS

## 2023-10-03 ASSESSMENT — PAIN DESCRIPTION - ONSET: ONSET: SUDDEN

## 2023-10-03 ASSESSMENT — PAIN DESCRIPTION - DESCRIPTORS
DESCRIPTORS: SORE;ACHING;TENDER
DESCRIPTORS: CRAMPING;DISCOMFORT
DESCRIPTORS: ACHING;DISCOMFORT;SORE

## 2023-10-03 ASSESSMENT — PAIN - FUNCTIONAL ASSESSMENT: PAIN_FUNCTIONAL_ASSESSMENT: 0-10

## 2023-10-03 ASSESSMENT — PAIN SCALES - GENERAL
PAINLEVEL_OUTOF10: 8
PAINLEVEL_OUTOF10: 2
PAINLEVEL_OUTOF10: 8

## 2023-10-03 ASSESSMENT — PAIN DESCRIPTION - PAIN TYPE: TYPE: ACUTE PAIN

## 2023-10-03 NOTE — ED PROVIDER NOTES
[address] ENCOUNTER        Pt Name: David East  MRN: [id number]  [address] [facility] 1985  Date of evaluation: 10/3/2023  Provider: Rickey Morley DO  PCP: Margie Ro DO  Note Started: 1:25 PM EDT 10/3/23    CHIEF COMPLAINT       Chief Complaint   Patient presents with    Emesis     Vomiting since Saturday. Unable to have a BM. Decreased urination. HISTORY OF PRESENT ILLNESS: 1 or more Elements   History From: patient    Limitations to history : None    David East is a 34 y.o. male who presents with generalized abdominal pain, nausea, emesis, chills, cough, congestion, sore throat and constipation beginning 4 days ago Friday. The complaint has been persistent, moderate in severity, and worsened by nothing. Emesis is without blood or bile. Patient denies fever, chest pain, shortness of breath, edema, headache, visual disturbances, focal paresthesias, focal weakness, diarrhea, dysuria, hematuria, trauma, neck or back pain, rash or other complaints. Reports history of colonoscopy 5 years ago that was completely normal, this was done due to strong family history but patient had been asymptomatic. Nursing Notes were all reviewed and agreed with or any disagreements were addressed in the HPI. REVIEW OF SYSTEMS :           Positives and Pertinent negatives as per HPI. SURGICAL HISTORY     Past Surgical History:   Procedure Laterality Date    ABDOMEN SURGERY         CURRENTMEDICATIONS       Discharge Medication List as of 10/3/2023  6:30 PM          ALLERGIES     Other    FAMILYHISTORY     History reviewed. No pertinent family history.      SOCIAL HISTORY       Social History     Tobacco Use    Smoking status: Every Day     Packs/day: 2.00     Years: 10.00     Additional pack years: 0.00     Total pack years: 20.00     Types: Cigarettes    Smokeless tobacco: Never   Substance Use Topics    Alcohol use:

## 2024-06-25 ENCOUNTER — HOSPITAL ENCOUNTER (EMERGENCY)
Age: 39
Discharge: HOME | End: 2024-06-25
Attending: STUDENT IN AN ORGANIZED HEALTH CARE EDUCATION/TRAINING PROGRAM

## 2024-06-25 ENCOUNTER — APPOINTMENT (OUTPATIENT)
Dept: GENERAL RADIOLOGY | Age: 39
End: 2024-06-25

## 2024-06-25 ENCOUNTER — APPOINTMENT (OUTPATIENT)
Dept: CT IMAGING | Age: 39
End: 2024-06-25

## 2024-06-25 VITALS
SYSTOLIC BLOOD PRESSURE: 133 MMHG | WEIGHT: 160 LBS | HEIGHT: 70 IN | OXYGEN SATURATION: 98 % | HEART RATE: 84 BPM | TEMPERATURE: 97.8 F | RESPIRATION RATE: 20 BRPM | DIASTOLIC BLOOD PRESSURE: 93 MMHG | BODY MASS INDEX: 22.9 KG/M2

## 2024-06-25 DIAGNOSIS — M54.42 ACUTE MIDLINE LOW BACK PAIN WITH LEFT-SIDED SCIATICA: ICD-10-CM
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
## 2024-06-25 PROCEDURE — 72131 CT LUMBAR SPINE W/O DYE: CPT

## 2024-06-25 PROCEDURE — 96372 THER/PROPH/DIAG INJ SC/IM: CPT

## 2024-06-25 PROCEDURE — 73502 X-RAY EXAM HIP UNI 2-3 VIEWS: CPT

## 2024-06-25 PROCEDURE — 6360000002 HC RX W HCPCS: Performed by: STUDENT IN AN ORGANIZED HEALTH CARE EDUCATION/TRAINING PROGRAM

## 2024-06-25 PROCEDURE — 99284 EMERGENCY DEPT VISIT MOD MDM: CPT

## 2024-06-25 PROCEDURE — 73552 X-RAY EXAM OF FEMUR 2/>: CPT

## 2024-06-25 PROCEDURE — 6370000000 HC RX 637 (ALT 250 FOR IP): Performed by: STUDENT IN AN ORGANIZED HEALTH CARE EDUCATION/TRAINING PROGRAM

## 2024-06-25 RX ORDER — ORPHENADRINE CITRATE 100 MG/1
100 TABLET, EXTENDED RELEASE ORAL 2 TIMES DAILY
Qty: 14 TABLET | Refills: 0 | Status: SHIPPED | OUTPATIENT
Start: 2024-06-25 | End: 2024-07-02

## 2024-06-25 RX ORDER — OXYCODONE HYDROCHLORIDE AND ACETAMINOPHEN 5; 325 MG/1; MG/1
1 TABLET ORAL ONCE
Status: COMPLETED | OUTPATIENT
Start: 2024-06-25 | End: 2024-06-25

## 2024-06-25 RX ORDER — LIDOCAINE 4 G/G
1 PATCH TOPICAL DAILY
Qty: 7 EACH | Refills: 0 | Status: SHIPPED | OUTPATIENT
Start: 2024-06-25 | End: 2024-07-02

## 2024-06-25 RX ORDER — IBUPROFEN 800 MG/1
800 TABLET ORAL EVERY 8 HOURS PRN
Qty: 12 TABLET | Refills: 0 | Status: SHIPPED | OUTPATIENT
Start: 2024-06-25 | End: 2024-06-29

## 2024-06-25 RX ORDER — METHYLPREDNISOLONE 4 MG/1
TABLET ORAL
Qty: 1 KIT | Refills: 0 | Status: SHIPPED | OUTPATIENT
Start: 2024-06-25 | End: 2024-06-25

## 2024-06-25 RX ORDER — ORPHENADRINE CITRATE 30 MG/ML
60 INJECTION INTRAMUSCULAR; INTRAVENOUS ONCE
Status: COMPLETED | OUTPATIENT
Start: 2024-06-25 | End: 2024-06-25

## 2024-06-25 RX ADMIN — ORPHENADRINE CITRATE 60 MG: 60 INJECTION INTRAMUSCULAR; INTRAVENOUS at 17:15

## 2024-06-25 RX ADMIN — OXYCODONE HYDROCHLORIDE AND ACETAMINOPHEN 1 TABLET: 5; 325 TABLET ORAL at 17:13

## 2024-06-25 ASSESSMENT — PAIN SCALES - GENERAL
PAINLEVEL_OUTOF10: 10

## 2024-06-25 ASSESSMENT — PAIN DESCRIPTION - DESCRIPTORS: DESCRIPTORS: NUMBNESS;SQUEEZING

## 2024-06-25 ASSESSMENT — PAIN DESCRIPTION - ORIENTATION
ORIENTATION: LEFT
ORIENTATION: LEFT;LOWER
ORIENTATION: LEFT;LOWER

## 2024-06-25 ASSESSMENT — PAIN DESCRIPTION - LOCATION
LOCATION: BACK;LEG
LOCATION: BACK;HIP;LEG
LOCATION: BACK;HIP;LEG

## 2024-06-25 ASSESSMENT — LIFESTYLE VARIABLES
HOW MANY STANDARD DRINKS CONTAINING ALCOHOL DO YOU HAVE ON A TYPICAL DAY: 1 OR 2
HOW OFTEN DO YOU HAVE A DRINK CONTAINING ALCOHOL: 2-4 TIMES A MONTH

## 2024-06-25 ASSESSMENT — PAIN - FUNCTIONAL ASSESSMENT: PAIN_FUNCTIONAL_ASSESSMENT: 0-10

## 2024-06-25 NOTE — ED PROVIDER NOTES
Name: Nabeel Bower    MRN: [id number]     Date / Time Roomed:  6/25/2024  4:27 PM  ED Bed Assignment:  19/19    ------------------ History of Present Illness --------------------  6/25/24, Time: 4:43 PM EDT   Chief Complaint   Patient presents with    Fall     Slipped on the ground while working. Lower back and leg pain. Pt states he cannot stand by himself or put weight on that side.       HPI    Nabeel Bower is a 39 y.o. male, with hx of GERD, who presents to the ED today for fall, which occurred earlier today.  Patient relates he slipped and fell at work and landed on his lower back and left side.  Denies any loss consciousness.  Denies hitting his head or any neck pain.  Patient complains of low back pain.  With pain to the left hip and paresthesia down the left leg.  He states he has had difficulty walking or putting weight down on the leg as this causes sharp pain in his low back and hip area.  He did place a lidocaine patch however this has not helped much.  Symptoms been constant, moderate in severity, no exacerbating relieving factors. denies any chest pain, shortness of breath or abdominal pain. The pt denies other ROS at this time.     PCP: Potocki, Joseph A, DO.    -------------------- PMH --------------------    Past Medical History:   has a past medical history of GERD (gastroesophageal reflux disease) and Ruptured spleen.     Surgical History:  Past Surgical History:   Procedure Laterality Date    ABDOMEN SURGERY         Social History:  Social History     Tobacco Use    Smoking status: Every Day     Current packs/day: 2.00     Average packs/day: 2.0 packs/day for 10.0 years (20.0 ttl pk-yrs)     Types: Cigarettes    Smokeless tobacco: Never   Vaping Use    Vaping Use: Never used   Substance Use Topics    Alcohol use: Yes     Comment: socially    Drug use: Yes     Types: Marijuana (Weed)       Family History:  History reviewed. No pertinent family history.    Allergies:  Other    The patient’s

## 2024-06-25 NOTE — ED NOTES
Patient ambulated approximately 25 feet.  Patient tolerated fairly well with slow gait, unsteady during turn around.  Physician notified.

## 2024-06-25 NOTE — DISCHARGE INSTRUCTIONS
Please follow-up with your primary care doctor.  Please return the hospital for any new or worsening symptoms.     XR FEMUR LEFT (MIN 2 VIEWS)   Final Result   No acute osseous findings.         XR HIP 2-3 VW W PELVIS LEFT   Final Result   No acute osseous findings.         CT LUMBAR SPINE WO CONTRAST   Final Result   1. No acute osseous abnormality.   2. Degenerative disc disease at L5-S1 with mild central canal narrowing.   Traversing left S1 nerve root appears to be contacted as it enters the   lateral recess. Correlate for left S1 radiculopathy.   3. Moderate/severe bilateral foraminal narrowing at L5-S1. Correlate with L5   radiculopathy.   4. Small disc bulge eccentric to the right at L4-5.